# Patient Record
Sex: MALE | Race: WHITE | Employment: OTHER | URBAN - METROPOLITAN AREA
[De-identification: names, ages, dates, MRNs, and addresses within clinical notes are randomized per-mention and may not be internally consistent; named-entity substitution may affect disease eponyms.]

---

## 2017-04-06 PROBLEM — G25.81 RLS (RESTLESS LEGS SYNDROME): Status: ACTIVE | Noted: 2017-04-06

## 2017-04-06 PROBLEM — G47.8 UPPER AIRWAY RESISTANCE SYNDROME: Status: ACTIVE | Noted: 2017-04-06

## 2017-04-06 PROBLEM — N40.1 BENIGN NON-NODULAR PROSTATIC HYPERPLASIA WITH LOWER URINARY TRACT SYMPTOMS: Status: ACTIVE | Noted: 2017-04-06

## 2018-07-18 PROBLEM — N39.0 RECURRENT UTI: Status: ACTIVE | Noted: 2018-07-18

## 2018-07-18 PROBLEM — N28.1 BILATERAL RENAL CYSTS: Status: ACTIVE | Noted: 2018-07-18

## 2018-07-18 PROBLEM — R31.29 MICROHEMATURIA: Status: ACTIVE | Noted: 2018-07-18

## 2018-07-18 PROBLEM — R35.1 NOCTURIA: Status: ACTIVE | Noted: 2018-07-18

## 2019-05-09 ENCOUNTER — HOSPITAL ENCOUNTER (OUTPATIENT)
Dept: ULTRASOUND IMAGING | Facility: CLINIC | Age: 74
Discharge: HOME | End: 2019-05-11
Payer: MEDICARE

## 2019-05-09 DIAGNOSIS — M54.9 UPPER BACK PAIN ON RIGHT SIDE: ICD-10-CM

## 2019-05-09 PROCEDURE — 76705 ECHO EXAM OF ABDOMEN: CPT

## 2019-06-06 ENCOUNTER — HOSPITAL ENCOUNTER (OUTPATIENT)
Dept: MAMMOGRAPHY | Facility: CLINIC | Age: 74
Discharge: HOME | End: 2019-06-08
Payer: MEDICARE

## 2019-06-06 DIAGNOSIS — M85.9 DISORDER OF BONE DENSITY AND STRUCTURE, UNSPECIFIED: ICD-10-CM

## 2019-06-06 DIAGNOSIS — M85.80 OSTEOPENIA, UNSPECIFIED LOCATION: ICD-10-CM

## 2019-06-06 PROCEDURE — 77080 DXA BONE DENSITY AXIAL: CPT

## 2019-08-21 ENCOUNTER — APPOINTMENT (OUTPATIENT)
Dept: GENERAL RADIOLOGY | Age: 74
End: 2019-08-21
Payer: MEDICARE

## 2019-08-21 ENCOUNTER — HOSPITAL ENCOUNTER (EMERGENCY)
Age: 74
Discharge: HOME | End: 2019-08-21
Attending: EMERGENCY MEDICINE
Payer: MEDICARE

## 2019-08-21 VITALS
SYSTOLIC BLOOD PRESSURE: 143 MMHG | HEIGHT: 73 IN | BODY MASS INDEX: 27.3 KG/M2 | HEART RATE: 78 BPM | OXYGEN SATURATION: 97 % | TEMPERATURE: 98.5 F | DIASTOLIC BLOOD PRESSURE: 105 MMHG | RESPIRATION RATE: 14 BRPM | WEIGHT: 206 LBS

## 2019-08-21 DIAGNOSIS — R07.9 CHEST PAIN, UNSPECIFIED TYPE: Primary | ICD-10-CM

## 2019-08-21 LAB
ABSOLUTE EOS #: 0.09 K/UL (ref 0–0.44)
ABSOLUTE IMMATURE GRANULOCYTE: 0.01 K/UL (ref 0–0.3)
ABSOLUTE LYMPH #: 1.08 K/UL (ref 1.1–3.7)
ABSOLUTE MONO #: 0.29 K/UL (ref 0.1–1.2)
ANION GAP SERPL CALCULATED.3IONS-SCNC: 11 MMOL/L (ref 9–17)
BASOPHILS # BLD: 1 % (ref 0–2)
BASOPHILS ABSOLUTE: 0.03 K/UL (ref 0–0.2)
BUN BLDV-MCNC: 17 MG/DL (ref 8–23)
BUN/CREAT BLD: 16 (ref 9–20)
CALCIUM SERPL-MCNC: 9.4 MG/DL (ref 8.6–10.4)
CHLORIDE BLD-SCNC: 105 MMOL/L (ref 98–107)
CO2: 24 MMOL/L (ref 20–31)
CREAT SERPL-MCNC: 1.07 MG/DL (ref 0.7–1.2)
D-DIMER QUANTITATIVE: <0.17 MG/L FEU
DIFFERENTIAL TYPE: ABNORMAL
EOSINOPHILS RELATIVE PERCENT: 2 % (ref 1–4)
GFR AFRICAN AMERICAN: >60 ML/MIN
GFR NON-AFRICAN AMERICAN: >60 ML/MIN
GFR SERPL CREATININE-BSD FRML MDRD: ABNORMAL ML/MIN/{1.73_M2}
GFR SERPL CREATININE-BSD FRML MDRD: ABNORMAL ML/MIN/{1.73_M2}
GLUCOSE BLD-MCNC: 107 MG/DL (ref 70–99)
HCT VFR BLD CALC: 47.3 % (ref 40.7–50.3)
HEMOGLOBIN: 15.6 G/DL (ref 13–17)
IMMATURE GRANULOCYTES: 0 %
LYMPHOCYTES # BLD: 24 % (ref 24–43)
MCH RBC QN AUTO: 31.1 PG (ref 25.2–33.5)
MCHC RBC AUTO-ENTMCNC: 33 G/DL (ref 28.4–34.8)
MCV RBC AUTO: 94.2 FL (ref 82.6–102.9)
MONOCYTES # BLD: 6 % (ref 3–12)
NRBC AUTOMATED: 0 PER 100 WBC
PDW BLD-RTO: 13.7 % (ref 11.8–14.4)
PLATELET # BLD: 160 K/UL (ref 138–453)
PLATELET ESTIMATE: ABNORMAL
PMV BLD AUTO: 10.5 FL (ref 8.1–13.5)
POTASSIUM SERPL-SCNC: 4 MMOL/L (ref 3.7–5.3)
RBC # BLD: 5.02 M/UL (ref 4.21–5.77)
RBC # BLD: ABNORMAL 10*6/UL
SEG NEUTROPHILS: 67 % (ref 36–65)
SEGMENTED NEUTROPHILS ABSOLUTE COUNT: 3.05 K/UL (ref 1.5–8.1)
SODIUM BLD-SCNC: 140 MMOL/L (ref 135–144)
TROPONIN INTERP: NORMAL
TROPONIN INTERP: NORMAL
TROPONIN T: NORMAL NG/ML
TROPONIN T: NORMAL NG/ML
TROPONIN, HIGH SENSITIVITY: 7 NG/L (ref 0–22)
TROPONIN, HIGH SENSITIVITY: 8 NG/L (ref 0–22)
WBC # BLD: 4.6 K/UL (ref 3.5–11.3)
WBC # BLD: ABNORMAL 10*3/UL

## 2019-08-21 PROCEDURE — 85025 COMPLETE CBC W/AUTO DIFF WBC: CPT

## 2019-08-21 PROCEDURE — 71046 X-RAY EXAM CHEST 2 VIEWS: CPT

## 2019-08-21 PROCEDURE — 85379 FIBRIN DEGRADATION QUANT: CPT

## 2019-08-21 PROCEDURE — 84484 ASSAY OF TROPONIN QUANT: CPT

## 2019-08-21 PROCEDURE — 6370000000 HC RX 637 (ALT 250 FOR IP): Performed by: EMERGENCY MEDICINE

## 2019-08-21 PROCEDURE — 99285 EMERGENCY DEPT VISIT HI MDM: CPT

## 2019-08-21 PROCEDURE — 93005 ELECTROCARDIOGRAM TRACING: CPT

## 2019-08-21 PROCEDURE — 80048 BASIC METABOLIC PNL TOTAL CA: CPT

## 2019-08-21 RX ORDER — ASPIRIN 81 MG/1
324 TABLET, CHEWABLE ORAL ONCE
Status: COMPLETED | OUTPATIENT
Start: 2019-08-21 | End: 2019-08-21

## 2019-08-21 RX ORDER — ZINC OXIDE 13 %
1 CREAM (GRAM) TOPICAL DAILY
COMMUNITY

## 2019-08-21 RX ADMIN — ASPIRIN 81 MG 324 MG: 81 TABLET ORAL at 10:59

## 2019-08-21 ASSESSMENT — HEART SCORE: ECG: 0

## 2019-08-21 NOTE — ED PROVIDER NOTES
 Upper airway resistance syndrome 2017     SURGICAL HISTORY       Past Surgical History:   Procedure Laterality Date    CATARACT REMOVAL Right 10/01/2018    CATARACT REMOVAL Left 10/16/2018    COLONOSCOPY      VASECTOMY       CURRENT MEDICATIONS       Discharge Medication List as of 2019  1:32 PM      CONTINUE these medications which have NOT CHANGED    Details   b complex vitamins tablet Take 1 tablet by mouth dailyHistorical Med      Probiotic Product (PROBIOTIC DAILY) CAPS Take 1 capsule by mouth dailyHistorical Med      NONFORMULARY Take 1 tablet by mouth daily OTC Mindworks supplement tabletHistorical Med      Alpha-Lipoic Acid 300 MG TABS Take 300 mg by mouth dailyHistorical Med      losartan (COZAAR) 25 MG tablet Take 12.5 mg by mouth nightly 1/2 tab (=12.5mg) nightly onlyHistorical Med      diltiazem (CARDIZEM) 30 MG tablet Take 1 tablet by mouth 2 times daily, Disp-60 tablet, R-2Normal      thyroid (ARMOUR) 32.5 MG tablet Take 48.75 mg by mouth every morning (before breakfast) Takes 1.5 tabs (=48.75mg) dailyHistorical Med      rivaroxaban (XARELTO) 20 MG TABS tablet Take 1 tablet by mouth daily, Disp-90 tablet, R-1Print      Riboflavin 100 MG CAPS Take 100 mg by mouth 2 times daily Historical Med      magnesium oxide (MAG-OX) 400 MG tablet Take 400 mg by mouth 2 times daily Historical Med      Multiple Vitamins-Minerals (THERAPEUTIC MULTIVITAMIN-MINERALS) tablet Take 1 tablet by mouth dailyHistorical Med      Docosahexaenoic Acid (DHA COMPLETE PO) Take 600 mg by mouth daily Historical Med      zoster recombinant adjuvanted vaccine (SHINGRIX) 50 MCG SUSR injection Inject 0.5 ml IM, then repeat 2-6 months later. , Disp-0.5 mL, R-1Normal           ALLERGIES     is allergic to bactrim ds [sulfamethoxazole-trimethoprim] and flomax [tamsulosin]. FAMILY HISTORY     He indicated that his mother is . He indicated that his father is . He indicated that his brother is alive.      SOCIAL HISTORY       Social History     Tobacco Use    Smoking status: Former Smoker    Smokeless tobacco: Never Used   Substance Use Topics    Alcohol use: Yes     Alcohol/week: 7.0 standard drinks     Types: 7 Glasses of wine per week    Drug use: No     PHYSICAL EXAM     INITIAL VITALS: BP (!) 143/105   Pulse 78   Temp 98.5 °F (36.9 °C) (Oral)   Resp 14   Ht 6' 1\" (1.854 m)   Wt 206 lb (93.4 kg)   SpO2 97%   BMI 27.18 kg/m²    Physical Exam   Constitutional: He is oriented to person, place, and time. He appears well-developed and well-nourished. No distress. HENT:   Head: Normocephalic. Eyes: Pupils are equal, round, and reactive to light. Cardiovascular: Normal rate and normal heart sounds. An irregularly irregular rhythm present. Pulmonary/Chest: Effort normal and breath sounds normal. No respiratory distress. Abdominal: Soft. Bowel sounds are normal. There is no tenderness. Musculoskeletal: Normal range of motion. Neurological: He is alert and oriented to person, place, and time. Skin: Skin is warm and dry. Psychiatric: He has a normal mood and affect. MEDICAL DECISION MAKING:     Isolated episode of moderately sharp left-sided chest pain that lasted about 5 minutes and 23-year-old male patient. Patient's heart score is 3 based on age giving him 2 points and history of hypertension giving him one point. History does not sound cardiac in nature. He has been pain-free here in the emergency department. EKG showed A. fib with nonspecific T wave flattening. Troponin enzymes were trended over 2 hours both of which were negative. Patient's other blood work and chest x-ray were unremarkable. Patient reported echo and stress testing done within the last 6 months both of which were unremarkable. Patient has an appointment with his cardiologist next Wednesday and was instructed to return for any new or worsening symptoms.        CRITICAL CARE:

## 2019-08-21 NOTE — PROGRESS NOTES
Transitions of Care Pharmacy Service   Medication Review    The patient's list of current home medications has been reviewed and updated. Source(s) of information: patient, [place], [employer]    Please feel free to call with any questions about this encounter. Thank you. Leslie Neal, [id number] [facility]  Transitions of Care Pharmacy Service  Phone:  [phone]  Fax: [phone]            Prior to Admission medications    Medication Sig       b complex vitamins tablet Take 1 tablet by mouth daily       Probiotic Product (PROBIOTIC DAILY) CAPS Take 1 capsule by mouth daily       NONFORMULARY--  Mindworks supplement tablet Take 1 tablet by mouth daily OTC        Alpha-Lipoic Acid 300 MG TABS Take 300 mg by mouth daily       losartan (COZAAR) 25 MG tablet Take 12.5 mg by mouth nightly 1/2 tab (=12.5mg) nightly only       diltiazem (CARDIZEM) 30 MG tablet Take 1 tablet by mouth 2 times daily       thyroid (ARMOUR) 32.5 MG tablet Take 48.75 mg by mouth every morning (before breakfast) Takes 1.5 tabs (=48.75mg) daily       rivaroxaban (XARELTO) 20 MG TABS tablet Take 1 tablet by mouth daily       Riboflavin 100 MG CAPS Take 100 mg by mouth 2 times daily        magnesium oxide (MAG-OX) 400 MG tablet Take 400 mg by mouth 2 times daily        Multiple Vitamins-Minerals (THERAPEUTIC MULTIVITAMIN-MINERALS) tablet Take 1 tablet by mouth daily       Docosahexaenoic Acid (DHA COMPLETE PO) Take 600 mg by mouth daily                zoster recombinant adjuvanted vaccine (SHINGRIX) 50 MCG SUSR injection Inject 0.5 ml IM, then repeat 2-6 months later.

## 2019-08-25 LAB
EKG ATRIAL RATE: 241 BPM
EKG Q-T INTERVAL: 334 MS
EKG QRS DURATION: 90 MS
EKG QTC CALCULATION (BAZETT): 404 MS
EKG R AXIS: 62 DEGREES
EKG T AXIS: 63 DEGREES
EKG VENTRICULAR RATE: 88 BPM